# Patient Record
Sex: FEMALE | Race: WHITE | Employment: PART TIME | URBAN - METROPOLITAN AREA
[De-identification: names, ages, dates, MRNs, and addresses within clinical notes are randomized per-mention and may not be internally consistent; named-entity substitution may affect disease eponyms.]

---

## 2017-12-11 ENCOUNTER — HOSPITAL ENCOUNTER (OUTPATIENT)
Dept: MAMMOGRAPHY | Facility: CLINIC | Age: 47
Discharge: HOME | End: 2017-12-11
Attending: FAMILY MEDICINE | Admitting: FAMILY MEDICINE
Payer: COMMERCIAL

## 2017-12-11 DIAGNOSIS — Z12.31 VISIT FOR SCREENING MAMMOGRAM: ICD-10-CM

## 2017-12-11 PROCEDURE — 77063 BREAST TOMOSYNTHESIS BI: CPT

## 2018-12-14 ENCOUNTER — HOSPITAL ENCOUNTER (OUTPATIENT)
Dept: MAMMOGRAPHY | Facility: CLINIC | Age: 48
Discharge: HOME | End: 2018-12-14
Attending: FAMILY MEDICINE | Admitting: FAMILY MEDICINE
Payer: COMMERCIAL

## 2018-12-14 DIAGNOSIS — Z12.31 VISIT FOR SCREENING MAMMOGRAM: ICD-10-CM

## 2018-12-14 PROCEDURE — 77067 SCR MAMMO BI INCL CAD: CPT

## 2019-02-19 ENCOUNTER — HOSPITAL ENCOUNTER (OUTPATIENT)
Dept: MAMMOGRAPHY | Facility: CLINIC | Age: 49
Discharge: HOME | End: 2019-02-19
Attending: SURGERY | Admitting: SURGERY
Payer: COMMERCIAL

## 2019-02-19 ENCOUNTER — OFFICE VISIT (OUTPATIENT)
Dept: SURGERY | Facility: CLINIC | Age: 49
End: 2019-02-19
Payer: COMMERCIAL

## 2019-02-19 ENCOUNTER — HOSPITAL ENCOUNTER (OUTPATIENT)
Dept: LAB | Facility: CLINIC | Age: 49
End: 2019-02-19
Attending: SURGERY
Payer: COMMERCIAL

## 2019-02-19 VITALS
HEIGHT: 69 IN | SYSTOLIC BLOOD PRESSURE: 120 MMHG | HEART RATE: 65 BPM | BODY MASS INDEX: 22.81 KG/M2 | DIASTOLIC BLOOD PRESSURE: 80 MMHG | WEIGHT: 154 LBS

## 2019-02-19 DIAGNOSIS — N64.52 BILATERAL NIPPLE DISCHARGE: ICD-10-CM

## 2019-02-19 DIAGNOSIS — N64.52 BILATERAL NIPPLE DISCHARGE: Primary | ICD-10-CM

## 2019-02-19 LAB — PROLACTIN SERPL-MCNC: 5 UG/L (ref 3–27)

## 2019-02-19 PROCEDURE — 36415 COLL VENOUS BLD VENIPUNCTURE: CPT | Performed by: SURGERY

## 2019-02-19 PROCEDURE — 84146 ASSAY OF PROLACTIN: CPT | Performed by: SURGERY

## 2019-02-19 PROCEDURE — 99243 OFF/OP CNSLTJ NEW/EST LOW 30: CPT | Performed by: SURGERY

## 2019-02-19 PROCEDURE — 76642 ULTRASOUND BREAST LIMITED: CPT | Mod: 50

## 2019-02-19 SDOH — HEALTH STABILITY: MENTAL HEALTH: HOW OFTEN DO YOU HAVE A DRINK CONTAINING ALCOHOL?: NEVER

## 2019-02-19 ASSESSMENT — MIFFLIN-ST. JEOR: SCORE: 1397.92

## 2019-02-19 NOTE — NURSING NOTE
Breast Patients    BREAST PATIENTS (ALL)    1-Do you have any of the following symptoms? Breast Pain and Nipple Discharge   2-In which breast are you having the symptoms? both  3-Do you use hormones?  No  4-Have you had a Mammogram? Yes  Where: [facility] Date: 12/14/18  5-Have you ever had a breast cyst drained? No  6-Have you ever had a breast biopsy? Yes  Side: [facility]  Date: ?  7-Have you ever had a Breast Cancer? No   8-Is there a history of Breast Cancer in your family? No  9-Have you ever had Ovarian Cancer? No  10-Is there a history of Ovarian Cancer in your family? No  11-Summarize your caffeine intake (i.e. coffee, tea, chocolate, soda etc.): Minimal     BREAST PATIENTS (FEMALE)    12-What age did your periods begin? 15  13-Date your last menstrual period began? 2/1/19  14-Number of full-term pregnancies: 2  15-Your age when your first child was born? 34  16-Did you nurse your children? Yes  17-Are you pregnant now? No  18-Have you begun menopause? No  19-Have you had either ovary removed?No  20-Do you have breast implants? No     Patience Garcia MA

## 2019-02-19 NOTE — PROGRESS NOTES
[facility] Breast Surgery Consultation    HPI:   Carmen Malave is a 48 year old female who is seen in consultation at the request of Dr. Good for evaluation of bilateral nipple discharge. She reports she had her screening mammogram in December 2018 and with compression noticed nipple drainage on her left breast. She followed up with her PCP and on exam nipple drainage was again noted. She did a self exam later that night and with compression noted drainage from both breasts. Prior to her mammogram she had not had any concerns about either breast. 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 She has not noticed any drainage on her bra. No spontaneous drainage. Color has varied from clear to yellowish. She had her thyroid labs checked last week and they were normal.       Hormonal history:   menarche 15,  2 children, 1st at age 34, pre-menopausal, on and off 15 years OCP use, no HRT, no fertility treatment.     Family history of breast cancer: No  Family history of ovarian cancer:  No  Family history of colon cancer: No  Family history of prostate cancer: No    Imaging:   Screening mammogram 12/2018 - negative.     Past Medical History:  reviewed    Current Outpatient Medications:      Azelaic Acid-Cleanser-Lotion (FINACEA PLUS EX), , Disp: , Rfl:      Fexofenadine HCl (ALLEGRA PO), , Disp: , Rfl:     Past Surgical History:  Past Surgical History:   Procedure Laterality Date     BIOPSY Right 2013    Ultrasound Breast BX  - Benign ([facility])     BIOPSY BREAST Right 2015         No Known Allergies      Social History:  Social History     Socioeconomic History     Marital status:      Spouse name: Not on file     Number of children: Not on file     Years of education: Not on file     Highest education level: Not on file   Social Needs     Financial resource strain: Not on file     Food insecurity -  "worry: Not on file     Food insecurity - inability: Not on file     Transportation needs - medical: Not on file     Transportation needs - non-medical: Not on file   Occupational History     Not on file   Tobacco Use     Smoking status: Never Smoker     Smokeless tobacco: Never Used   Substance and Sexual Activity     Alcohol use: No     Frequency: Never     Drug use: No     Sexual activity: Not on file   Other Topics Concern     Not on file   Social History Narrative     Not on file        ROS:  The 10 point review of systems is negative other than noted in the HPI and above.    PE:  Vitals: /80   Pulse 65   Ht 1.753 m (5' 9\")   Wt 69.9 kg (154 lb)   BMI 22.74 kg/m    General appearance: well-nourished, sitting comfortably, no apparent distress  Psych: normal affect, pleasant  HEENT:  Head normocephalic and atraumatic, pupils equal and round, conjunctivae clear, mucous membranes moist, external ears and nose normal  Neck: Supple without thyromegaly or masses  Lungs: Respirations unlabored  Lymphatic: No cervical, or supraclavicular lymphadenopathy  Extremities: Without edema  Musculoskeletal:  Normal station and gait  Neurologic: nonfocal, grossly intact times four extremities, alert and oriented times three  Psychiatric: Mood and affect are appropriate  Skin: Without lesions or rashes    Breast:  A bilateral breast exam was performed in the supine position.. Bilateral breasts were palpated in a circumferential clockwise fashion including the supraclavicular and axillary areas.   Breasts are symmetrical with no skin or nipple changes. With compression there is yellowish drainage from several ducts on the left and two ducts on the right. The contour is normal Parenchyma is extremely dense.  There is a palpable mass which is 3cm in size on the right breast at 12:00 at site of \[facility]\"".     Lymph:       No supraclavicular/infraclavicular adenopathy.   Axillary adenopathy: none    Assessment/Plan: Carmen Malave " is a 48 year old who presents with bilateral nipple discharge. We discussed pathologic and physiologic nipple discharge and I believe this is likely physiologic as it is not spontaneous, is bilateral and multi-ductal. I would recommend bilateral breast retroareolar ultrasound to further evaluate and would check prolactin to ensure not related to hyperprolactinemia. If these are both within normal, she should return to annual mammography. If there are findings - I will call and discuss with her. If she develops new breast concerns, change in drainage/pain/mass/etc -  I will see her back to further discuss options.       Time spent with the patient with greater that 50% of the time in discussion was 30 minutes.    Mellisa Bates MD      Please route or send letter to:  Primary Care Provider (PCP) and Referring Provider

## 2020-01-16 ENCOUNTER — HOSPITAL ENCOUNTER (OUTPATIENT)
Dept: MAMMOGRAPHY | Facility: CLINIC | Age: 50
Discharge: HOME | End: 2020-01-16
Attending: FAMILY MEDICINE | Admitting: FAMILY MEDICINE
Payer: COMMERCIAL

## 2020-01-16 DIAGNOSIS — Z12.31 VISIT FOR SCREENING MAMMOGRAM: ICD-10-CM

## 2020-01-16 PROCEDURE — 77067 SCR MAMMO BI INCL CAD: CPT
